# Patient Record
Sex: FEMALE | Race: WHITE
[De-identification: names, ages, dates, MRNs, and addresses within clinical notes are randomized per-mention and may not be internally consistent; named-entity substitution may affect disease eponyms.]

---

## 2018-12-19 PROBLEM — Z00.00 ENCOUNTER FOR PREVENTIVE HEALTH EXAMINATION: Status: ACTIVE | Noted: 2018-12-19

## 2019-01-10 ENCOUNTER — APPOINTMENT (OUTPATIENT)
Dept: SURGERY | Facility: CLINIC | Age: 44
End: 2019-01-10
Payer: COMMERCIAL

## 2019-01-10 VITALS
HEIGHT: 64 IN | WEIGHT: 232 LBS | SYSTOLIC BLOOD PRESSURE: 136 MMHG | BODY MASS INDEX: 39.61 KG/M2 | DIASTOLIC BLOOD PRESSURE: 78 MMHG

## 2019-01-10 DIAGNOSIS — Z78.9 OTHER SPECIFIED HEALTH STATUS: ICD-10-CM

## 2019-01-10 DIAGNOSIS — Z82.49 FAMILY HISTORY OF ISCHEMIC HEART DISEASE AND OTHER DISEASES OF THE CIRCULATORY SYSTEM: ICD-10-CM

## 2019-01-10 DIAGNOSIS — L72.0 EPIDERMAL CYST: ICD-10-CM

## 2019-01-10 PROCEDURE — 99202 OFFICE O/P NEW SF 15 MIN: CPT

## 2019-01-11 PROBLEM — L72.0 EPIDERMAL INCLUSION CYST: Status: ACTIVE | Noted: 2019-01-11

## 2019-01-11 PROBLEM — Z78.9 CAFFEINE USE: Status: ACTIVE | Noted: 2019-01-10

## 2019-01-11 PROBLEM — Z82.49 FAMILY HISTORY OF HYPERTENSION: Status: ACTIVE | Noted: 2019-01-10

## 2019-01-11 NOTE — PHYSICAL EXAM
[Normal Thyroid] : the thyroid was normal [Normal Breath Sounds] : Normal breath sounds [Normal Heart Sounds] : normal heart sounds [Normal Rate and Rhythm] : normal rate and rhythm [de-identified] : No cervical supraclavicular or axillary adenopathy bilaterally. [de-identified] : The left scapular region of the back demonstrates a 1 cm soft mobile subcutaneous mass just to the left of the midline.  This has a cystic character to it and is slightly tender, but there is no local erythema, induration, or drainage.

## 2019-01-11 NOTE — HISTORY OF PRESENT ILLNESS
[de-identified] : The patient noticed a tender lump in the left upper back about 3 weeks ago which initially enlarged and became painful. In the interim it began to decrease in size and when seen by her primary care physician she was referred for surgical evaluation and not placed on any antibiotics. The lump has continued to improve since and there is no history of local trauma or infection in the past.

## 2019-01-11 NOTE — ASSESSMENT
[FreeTextEntry1] : Small, inflamed epidermal inclusion cyst of the left upper back, which seems to be resolving on its own with no antibiotics. No surgery is felt to be necessary for this at this time. The patient will use a regular regimen of warm moist compresses to the area, and as long as it continues to resolve and become asymptomatic it may not require excision. All her questions were answered and she understands and agrees. She will return here as need be.